# Patient Record
Sex: FEMALE | Race: WHITE | NOT HISPANIC OR LATINO | URBAN - METROPOLITAN AREA
[De-identification: names, ages, dates, MRNs, and addresses within clinical notes are randomized per-mention and may not be internally consistent; named-entity substitution may affect disease eponyms.]

---
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
## 2025-01-02 ENCOUNTER — APPOINTMENT (OUTPATIENT)
Dept: RADIOLOGY | Facility: MEDICAL CENTER | Age: 20
End: 2025-01-02
Attending: EMERGENCY MEDICINE
Payer: MEDICAID

## 2025-01-02 ENCOUNTER — HOSPITAL ENCOUNTER (EMERGENCY)
Facility: MEDICAL CENTER | Age: 20
End: 2025-01-02
Attending: EMERGENCY MEDICINE
Payer: MEDICAID

## 2025-01-02 VITALS
BODY MASS INDEX: 42.94 KG/M2 | OXYGEN SATURATION: 97 % | DIASTOLIC BLOOD PRESSURE: 74 MMHG | HEIGHT: 69 IN | TEMPERATURE: 96.5 F | SYSTOLIC BLOOD PRESSURE: 114 MMHG | HEART RATE: 74 BPM | WEIGHT: 289.9 LBS | RESPIRATION RATE: 16 BRPM

## 2025-01-02 DIAGNOSIS — R42 DIZZY SPELLS: ICD-10-CM

## 2025-01-02 LAB
FLUAV RNA SPEC QL NAA+PROBE: NEGATIVE
FLUBV RNA SPEC QL NAA+PROBE: NEGATIVE
RSV RNA SPEC QL NAA+PROBE: NEGATIVE
SARS-COV-2 RNA RESP QL NAA+PROBE: NOTDETECTED
SPECIMEN SOURCE: NORMAL

## 2025-01-02 PROCEDURE — 99283 EMERGENCY DEPT VISIT LOW MDM: CPT | Mod: 25

## 2025-01-02 PROCEDURE — 70450 CT HEAD/BRAIN W/O DYE: CPT

## 2025-01-02 PROCEDURE — 0241U HCHG SARS-COV-2 COVID-19 NFCT DS RESP RNA 4 TRGT MIC: CPT

## 2025-01-02 RX ORDER — ASPIRIN 81 MG
6 TABLET, DELAYED RELEASE (ENTERIC COATED) ORAL 2 TIMES DAILY
Qty: 4 ML | Refills: 0 | Status: SHIPPED | OUTPATIENT
Start: 2025-01-02 | End: 2025-01-06

## 2025-01-02 RX ORDER — MECLIZINE HYDROCHLORIDE 25 MG/1
25 TABLET ORAL 3 TIMES DAILY PRN
Qty: 15 TABLET | Refills: 0 | Status: SHIPPED | OUTPATIENT
Start: 2025-01-02

## 2025-01-02 RX ORDER — ONDANSETRON 4 MG/1
4 TABLET, ORALLY DISINTEGRATING ORAL EVERY 8 HOURS PRN
Qty: 10 TABLET | Refills: 0 | Status: SHIPPED | OUTPATIENT
Start: 2025-01-02

## 2025-01-02 ASSESSMENT — FIBROSIS 4 INDEX: FIB4 SCORE: 0.28

## 2025-01-03 NOTE — ED PROVIDER NOTES
"ED Provider Note  CHIEF COMPLAINT  Chief Complaint   Patient presents with    Flu Like Symptoms     Light headedness, night sweats, and feeling fatigue.        JACQUE Jacobo (Spencer) is a 19 y.o. transgender female to male who presents for evaluation of continued lightheadedness, dizziness, and off-balance sensation.  He notes it is not necessarily positional but sometimes he feels like he is leaning when he is not.  He is not specific about direction but also notes that the lightheadedness that he has had since the 25th of 26 December, has been persistent.  He is also having trouble sleeping at night and waking without causes.  He notes no ear pain or loss of hearing, no recent fevers or chills.  He has had night sweats and has a feeling of generalized malaise and fatigue.  He has been evaluated at urgent care, and most recently in the emergency department here three ago.  Laboratory evaluation and CT imaging of the chest did not demonstrate any significant pathology requiring pacific treatment or inpatient hospitalization.  Patient is concerned that the lightheadedness persists and wonders if he should get \"an MRI of my brain.\"  EXTERNAL RECORDS REVIEWED  Reviewed ED visit 3 days ago.  ROS  Constitutional: No fevers or chills.  Night sweats, fatigue, malaise, lightheadedness  Skin: No rashes  HEENT: No sore throat, or runny nose.  No loss of hearing or ear pain.  No headache  Neck: No neck pain  Chest: No pain or rashes  Pulm: No shortness of breath, cough, wheezing, stridor, or pain with inspiration/expiration  Gastrointestinal: No nausea, vomiting, diarrhea, constipation, bloating, melena, hematochezia or abdominal pain.  Genitourinary: No dysuria or hematuria  Musculoskeletal: No pain, swelling, or focal weakness  Neurologic: No sensory or focal motor changes to extremities. No confusion or disorientation.  Heme: No bleeding or bruising problems.   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    SOCIAL HISTORY  Social History     Tobacco Use    Smoking status: Never    Smokeless tobacco: Never   Vaping Use    Vaping status: Never Used   Substance and Sexual Activity    Alcohol use: Never    Drug use: Never    Sexual activity: Not on file       SURGICAL HISTORY  patient denies any surgical history    CURRENT MEDICATIONS  Home Medications       Reviewed by Aron Lowery R.N. (Registered Nurse) on 01/02/25 at 1651  Med List Status: Not Addressed     Medication Last Dose Status   cetirizine (ZYRTEC) 10 MG TABS  Active   norethindrone (AYGESTIN) 5 MG tablet  Active   Pediatric Multiple Vit-C-FA (CHILDRENS MULTIVITAMIN) Chew Tab  Active   predniSONE (DELTASONE) 20 MG Tab  Active   SYRINGE/NEEDLE, DISP, 1 ML (BD LUER-JOSE MANUEL SYRINGE) 25G X 5/8\" 1 ML Misc  Active   ULTICARE TUBERCULIN SAFETY SYR 25G X 5/8\" 1 ML Misc  Active   vitamin D (CHOLECALCIFEROL) 1000 Unit (25 mcg) Tab  Active                  Audit from Redirected Encounters    **Home medications have not yet been reviewed for this encounter**          ALLERGIES  Allergies   Allergen Reactions    Seasonal Runny Nose     Runny Nose       PHYSICAL EXAM  VITAL SIGNS: /74   Pulse 74   Temp 35.8 °C (96.5 °F) (Temporal)   Resp 18   Ht 1.753 m (5' 9\")   Wt (!) 132 kg (289 lb 14.5 oz)   SpO2 97%   BMI 42.81 kg/m²    Gen: Alert in no apparent distress.  HEENT: No signs of trauma, Bilateral external ears normal, Nose normal. Conjunctiva normal, Non-icteric.  No significant erythema or lesions to the external canal on the right, TM partially occluded by cerumen however visible portions were nonerythemic and " "there did not appear to be any bulging or retraction.  Left TM occluded by cerumen.  No external canal lesions.  Cardiovascular: Regular rate and rhythm, no murmurs.  Capillary refill less than 3 seconds to all extremities, 2+ distal pulses.  Thorax & Lungs: Normal breath sounds, No respiratory distress, No wheezing bilateral chest rise  Abdomen: No distention  Skin: Warm, Dry, No erythema, No rash noted to exposed areas.   Extremities: Intact distal pulses, No edema  Neurologic: Alert , no facial droop, grossly normal coordination and strength  Psychiatric: Affect pleasant    INITIAL IMPRESSION  Patient arrives for similar symptoms that brought him in a few days ago and he is now concerned that this may be an issue \"in my head.\"  After discussion with the patient, I feel it is reasonable to perform a screening CT of the head without contrast to rule out a space-occupying lesion, however I let him know that I felt this was very unlikely to demonstrate any acute or significant pathology requiring intervention, based on the lack of any unilateral or localizing neurologic signs or symptoms.  He has no headache or neck stiffness and has not had any recent trauma.  Patient wished to proceed with CT imaging, and we will result the viral swab as well.  Unlikely that he will benefit from serum evaluation or advanced imaging at this point.    ED observation? No    LABS  Results for orders placed or performed during the hospital encounter of 01/02/25   CoV-2, Flu A/B, And RSV by PCR ([facility])    Collection Time: 01/02/25  5:04 PM    Specimen: Respirate   Result Value Ref Range    Influenza virus A RNA Negative Negative    Influenza virus B, PCR Negative Negative    RSV, PCR Negative Negative    SARS-CoV-2 by PCR NotDetected     SARS-CoV-2 Source Nasal Swab        RADIOLOGY  CT-HEAD W/O   Final Result      Negative noncontrast CT scan of the head.                   ASSESSMENT, COURSE AND PLAN  Care Narrative:   7:06 " PM  Patient calm, conversant, and states understanding of the findings.  There is no evidence for space-occupying lesion or any other emergent issue.  His viral swab is negative.  He is wondering if removing the earwax from his ears may help.  While this is possible, but is not necessarily emergent and I feel giving him Debrox drops for home is reasonable.  Regarding symptom treatment, we discussed options and I feel prescribing both Zofran for nausea, and meclizine for his vertiginous feelings is very reasonable.  I do not feel further labs or advanced imaging will benefit him beyond what is already been done.  He states understanding of this and is comfortable plan for follow-up.  He will likely need to contact his primary care physician for referral to an ENT if symptoms persist, but states clear understanding return instructions        I have discussed management of the patient with the following physicians and CONNOR's:      Escalation of care considered, and ultimately not performed:Laboratory analysis    Barriers to care at this time, including but not limited to: None.     Decision tools and Rx drugs considered including, but not limited to : Meclizine, Zofran, Debrox    Discussion of management with other QHP or appropriate source(s): None    The patient will not drink alcohol nor drive with prescribed medications. The patient will return for worsening symptoms and is stable at the time of discharge. The patient verbalizes understanding and will comply.    FINAL IMPRESSION  1. Dizzy spells        Electronically signed by: Galen Crane M.D., 1/2/2025 5:25 PM

## 2025-01-03 NOTE — ED TRIAGE NOTES
"Patient presents to the ER with the following complaints:    Chief Complaint   Patient presents with    Flu Like Symptoms     Light headedness, night sweats, and feeling fatigue.      BP (!) 147/85   Pulse 100   Temp 35.8 °C (96.5 °F) (Temporal)   Resp 18   Ht 1.753 m (5' 9\")   Wt (!) 132 kg (289 lb 14.5 oz)   SpO2 96%   BMI 42.81 kg/m²       "

## 2025-01-03 NOTE — ED NOTES
Dc instructions and medications discussed with patient at bedside. All questions answered at this time. VSS. Pt to lobby without incident. Friend to drive patient home.

## 2025-01-04 ENCOUNTER — HOSPITAL ENCOUNTER (EMERGENCY)
Facility: MEDICAL CENTER | Age: 20
End: 2025-01-05
Attending: EMERGENCY MEDICINE
Payer: MEDICAID

## 2025-01-04 DIAGNOSIS — R42 VERTIGO: ICD-10-CM

## 2025-01-04 DIAGNOSIS — R07.9 ACUTE CHEST PAIN: ICD-10-CM

## 2025-01-04 DIAGNOSIS — R42 DIZZINESS: ICD-10-CM

## 2025-01-04 LAB — EKG IMPRESSION: NORMAL

## 2025-01-04 PROCEDURE — 700102 HCHG RX REV CODE 250 W/ 637 OVERRIDE(OP): Performed by: EMERGENCY MEDICINE

## 2025-01-04 PROCEDURE — A9270 NON-COVERED ITEM OR SERVICE: HCPCS | Performed by: EMERGENCY MEDICINE

## 2025-01-04 PROCEDURE — 99284 EMERGENCY DEPT VISIT MOD MDM: CPT

## 2025-01-04 PROCEDURE — 93005 ELECTROCARDIOGRAM TRACING: CPT | Mod: TC | Performed by: EMERGENCY MEDICINE

## 2025-01-04 PROCEDURE — 36415 COLL VENOUS BLD VENIPUNCTURE: CPT

## 2025-01-04 RX ORDER — SODIUM CHLORIDE 9 MG/ML
1000 INJECTION, SOLUTION INTRAVENOUS ONCE
Status: DISCONTINUED | OUTPATIENT
Start: 2025-01-04 | End: 2025-01-05 | Stop reason: HOSPADM

## 2025-01-04 RX ORDER — MECLIZINE HYDROCHLORIDE 25 MG/1
25 TABLET ORAL ONCE
Status: COMPLETED | OUTPATIENT
Start: 2025-01-04 | End: 2025-01-04

## 2025-01-04 RX ADMIN — MECLIZINE HYDROCHLORIDE 25 MG: 25 TABLET ORAL at 23:43

## 2025-01-04 ASSESSMENT — FIBROSIS 4 INDEX: FIB4 SCORE: 0.28

## 2025-01-05 VITALS
DIASTOLIC BLOOD PRESSURE: 80 MMHG | TEMPERATURE: 97.3 F | HEART RATE: 89 BPM | SYSTOLIC BLOOD PRESSURE: 118 MMHG | RESPIRATION RATE: 20 BRPM | BODY MASS INDEX: 42.87 KG/M2 | HEIGHT: 69 IN | OXYGEN SATURATION: 95 % | WEIGHT: 289.46 LBS

## 2025-01-05 LAB
ALBUMIN SERPL BCP-MCNC: 4.2 G/DL (ref 3.2–4.9)
ALBUMIN/GLOB SERPL: 1.2 G/DL
ALP SERPL-CCNC: 121 U/L (ref 30–99)
ALT SERPL-CCNC: 26 U/L (ref 2–50)
ANION GAP SERPL CALC-SCNC: 15 MMOL/L (ref 7–16)
AST SERPL-CCNC: 24 U/L (ref 12–45)
BASOPHILS # BLD AUTO: 0.3 % (ref 0–1.8)
BASOPHILS # BLD: 0.05 K/UL (ref 0–0.12)
BILIRUB SERPL-MCNC: 0.2 MG/DL (ref 0.1–1.5)
BUN SERPL-MCNC: 7 MG/DL (ref 8–22)
CALCIUM ALBUM COR SERPL-MCNC: 9.1 MG/DL (ref 8.5–10.5)
CALCIUM SERPL-MCNC: 9.3 MG/DL (ref 8.4–10.2)
CHLORIDE SERPL-SCNC: 103 MMOL/L (ref 96–112)
CO2 SERPL-SCNC: 20 MMOL/L (ref 20–33)
CREAT SERPL-MCNC: 0.75 MG/DL (ref 0.5–1.4)
EOSINOPHIL # BLD AUTO: 0.46 K/UL (ref 0–0.51)
EOSINOPHIL NFR BLD: 3.2 % (ref 0–6.9)
ERYTHROCYTE [DISTWIDTH] IN BLOOD BY AUTOMATED COUNT: 38.6 FL (ref 35.9–50)
GFR SERPLBLD CREATININE-BSD FMLA CKD-EPI: 117 ML/MIN/1.73 M 2
GLOBULIN SER CALC-MCNC: 3.6 G/DL (ref 1.9–3.5)
GLUCOSE SERPL-MCNC: 82 MG/DL (ref 65–99)
HCG SERPL QL: NEGATIVE
HCT VFR BLD AUTO: 42.9 % (ref 37–47)
HGB BLD-MCNC: 14.3 G/DL (ref 12–16)
IMM GRANULOCYTES # BLD AUTO: 0.06 K/UL (ref 0–0.11)
IMM GRANULOCYTES NFR BLD AUTO: 0.4 % (ref 0–0.9)
LYMPHOCYTES # BLD AUTO: 3.56 K/UL (ref 1–4.8)
LYMPHOCYTES NFR BLD: 24.7 % (ref 22–41)
MAGNESIUM SERPL-MCNC: 2.1 MG/DL (ref 1.5–2.5)
MCH RBC QN AUTO: 26.2 PG (ref 27–33)
MCHC RBC AUTO-ENTMCNC: 33.3 G/DL (ref 32.2–35.5)
MCV RBC AUTO: 78.6 FL (ref 81.4–97.8)
MONOCYTES # BLD AUTO: 0.96 K/UL (ref 0–0.85)
MONOCYTES NFR BLD AUTO: 6.7 % (ref 0–13.4)
NEUTROPHILS # BLD AUTO: 9.3 K/UL (ref 1.82–7.42)
NEUTROPHILS NFR BLD: 64.7 % (ref 44–72)
NRBC # BLD AUTO: 0 K/UL
NRBC BLD-RTO: 0 /100 WBC (ref 0–0.2)
PLATELET # BLD AUTO: 225 K/UL (ref 164–446)
PMV BLD AUTO: 11.4 FL (ref 9–12.9)
POTASSIUM SERPL-SCNC: 3.7 MMOL/L (ref 3.6–5.5)
PROT SERPL-MCNC: 7.8 G/DL (ref 6–8.2)
RBC # BLD AUTO: 5.46 M/UL (ref 4.2–5.4)
SODIUM SERPL-SCNC: 138 MMOL/L (ref 135–145)
TROPONIN T SERPL-MCNC: <6 NG/L (ref 6–19)
TSH SERPL DL<=0.005 MIU/L-ACNC: 7.41 UIU/ML (ref 0.38–5.33)
WBC # BLD AUTO: 14.4 K/UL (ref 4.8–10.8)

## 2025-01-05 PROCEDURE — 80053 COMPREHEN METABOLIC PANEL: CPT

## 2025-01-05 PROCEDURE — 36415 COLL VENOUS BLD VENIPUNCTURE: CPT

## 2025-01-05 PROCEDURE — 84703 CHORIONIC GONADOTROPIN ASSAY: CPT

## 2025-01-05 PROCEDURE — 99284 EMERGENCY DEPT VISIT MOD MDM: CPT

## 2025-01-05 PROCEDURE — 84484 ASSAY OF TROPONIN QUANT: CPT

## 2025-01-05 PROCEDURE — 85025 COMPLETE CBC W/AUTO DIFF WBC: CPT

## 2025-01-05 PROCEDURE — 84443 ASSAY THYROID STIM HORMONE: CPT

## 2025-01-05 PROCEDURE — 83735 ASSAY OF MAGNESIUM: CPT

## 2025-01-05 ASSESSMENT — HEART SCORE
AGE: <45
RISK FACTORS: 1-2 RISK FACTORS
HISTORY: SLIGHTLY SUSPICIOUS
ECG: NORMAL
TROPONIN: LESS THAN OR EQUAL TO NORMAL LIMIT
HEART SCORE: 1

## 2025-01-05 NOTE — ED TRIAGE NOTES
"Chief Complaint   Patient presents with    Chest Pain     Intermittently for the past couple of weeks    Dizziness     For the past couple of weeks; sometimes feels like he's going to faint or pass out     BP (!) 145/97   Pulse (!) 118   Temp 36.3 °C (97.3 °F) (Temporal)   Resp 18   Ht 1.753 m (5' 9\")   Wt (!) 131 kg (289 lb 7.4 oz)   SpO2 96%   BMI 42.75 kg/m²     Patient presents with continued complaints of dizziness and chest pain that came on suddenly this evening while sitting and knitting. Also endorses that he felt a \"wave of confusion and didn't recognize [his] hands.\" States that these episodes started nearly 2 weeks ago and were occurring intermittently. This is the third ER visit for these same symptoms. Patient has a PCP appointment on Monday. Patient currently feels a bit dizzy \"like on a boat\".   "

## 2025-01-05 NOTE — ED NOTES
Patient discharged home in stable condition with friend  AVS provided with recommended follow up and home care instructions and education information  No prescriptions provided at this time  Patient and friend verbalized understanding  Ambulatory at time of discharge

## 2025-01-05 NOTE — ED PROVIDER NOTES
ED Provider Note    CHIEF COMPLAINT  Chief Complaint   Patient presents with    Chest Pain     Intermittently for the past couple of weeks    Dizziness     For the past couple of weeks; sometimes feels like he's going to faint or pass out       EXTERNAL RECORDS REVIEWED  Outpatient Notes reviewed office visit progress note dated December 30, 2024 by JUANJOSE Avilez.  Patient seen for dizziness and referred to emergency department for higher level of care.  and Outpatient labs & studies reviewed unremarkable CBC with normal hemoglobin of 13.2 as well as unremarkable CMP with only mild hyperglycemia 105 dated December 31, 2024.  Normal troponin at the time.  In addition reviewed unremarkable CTA of chest demonstrated no pulmonary embolism from same day    HPI/ROS  LIMITATION TO HISTORY   Select: : None  OUTSIDE HISTORIAN(S):  Significant other at bedside notes patient had strokelike symptoms last year that self resolved, were not evaluated at the time.    Kerri Jacobo is a 19 y.o. person who presents for evaluation of dizziness.  Patient relates at least 3 weeks of lightheaded dizzy sensation and feeling as if they might lose consciousness.  Also describes a vertiginous sensation described as feeling sensation of movement without any movement.  On and off, was feeling fairly well today but notes this evening after doing some knitting had recurrence of symptoms.  Describes both vertiginous and lightheaded, also sensation discomfort to left chest as well.  Notes transient diaphoresis that self resolved.  No focal numbness nor weakness, no loss of conscious nor head strike, no exertional component.    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    SURGICAL HISTORY  patient denies any surgical history    FAMILY HISTORY  Family History   Problem Relation Age of Onset    Arthritis Maternal Grandmother         RA    Diabetes Maternal Grandfather         T2DM    Breast Cancer  "Maternal great-grandmother        SOCIAL HISTORY  Social History     Tobacco Use    Smoking status: Never    Smokeless tobacco: Never   Vaping Use    Vaping status: Never Used   Substance and Sexual Activity    Alcohol use: Never    Drug use: Never    Sexual activity: Not on file       CURRENT MEDICATIONS  Home Medications       Reviewed by Mita Gallo R.N. (Registered Nurse) on 01/04/25 at 2240  Med List Status: Not Addressed     Medication Last Dose Status   carbamide peroxide (DEBROX) 6.5 % Solution  Active   cetirizine (ZYRTEC) 10 MG TABS  Active   meclizine (ANTIVERT) 25 MG Tab  Active   norethindrone (AYGESTIN) 5 MG tablet  Active   ondansetron (ZOFRAN ODT) 4 MG TABLET DISPERSIBLE  Active   Pediatric Multiple Vit-C-FA (CHILDRENS MULTIVITAMIN) Chew Tab  Active   predniSONE (DELTASONE) 20 MG Tab  Active   SYRINGE/NEEDLE, DISP, 1 ML (BD LUER-JOSE MANUEL SYRINGE) 25G X 5/8\" 1 ML Misc  Active   ULTICARE TUBERCULIN SAFETY SYR 25G X 5/8\" 1 ML Misc  Active   vitamin D (CHOLECALCIFEROL) 1000 Unit (25 mcg) Tab  Active                    ALLERGIES  Allergies   Allergen Reactions    Seasonal Runny Nose     Runny Nose       PHYSICAL EXAM  VITAL SIGNS: /80   Pulse 89   Temp 36.3 °C (97.3 °F) (Temporal)   Resp 20   Ht 1.753 m (5' 9\")   Wt (!) 131 kg (289 lb 7.4 oz)   SpO2 95%   BMI 42.75 kg/m²    .General: Alert, no acute distress  Skin: Warm, dry, normal for ethnicity  Head: Normocephalic, atraumatic  Neck: Trachea midline, no tenderness  Eye: PERRL, normal conjunctiva, extraocular movements intact with nystagmus with rapid component to the left.  Tympanic membranes pearly gray and unremarkable bilaterally.  ENMT: Oral mucosa dry, no pharyngeal erythema or exudate  Cardiovascular: regular rate and rhythm, No murmur, Normal peripheral perfusion  Respiratory: Lungs CTA, respirations are non-labored, breath sounds are equal  Gastrointestinal: Soft, nontender, non distended  Neurological: Alert and oriented to person, " place, time, and situation.  Cranial nerves II through XII grossly intact, no upper nor lower extremity pronator drift.  Upper and lower extremity strength and sensation 5 x 5 and symmetric bilaterally  Lymphatics: No lymphadenopathy  Psychiatric: Cooperative, appropriate mood & affect     EKG/LABS  Results for orders placed or performed during the hospital encounter of 25   EKG    Collection Time: 25 11:03 PM   Result Value Ref Range    Report       [facility] Emergency Dept.    Test Date:  2025  Pt Name:    ALYCIA MILLER               Department: [facility]  MRN:        [id number]                      Room:  Gender:     Female                       Technician: [id number]  :        2005                   Requested By:CUAUHTEMOC KAUR  Order #:    [id number]                    Reading MD: CUAUHTEMOC KAUR MD    Measurements  Intervals                                Axis  Rate:       105                          P:          59  AR:         140                          QRS:        85  QRSD:       87                           T:          27  QT:         331  QTc:        438    Interpretive Statements  Sinus tachycardia  Borderline Q waves in inferior leads  Baseline wander in lead(s) II,aVR,aVF  Compared to ECG 2024 05:43:43  Sinus rhythm no longer present  Possible ischemia no longer present  Electronically Signed On 2025 23:03:04 PST by CUAUHTEMOC KAUR MD     HCG Qual Serum    Collection Time: 25 12:25 AM   Result Value Ref Range    Beta-Hcg Qualitative Serum Negative Negative   CBC w/ Differential    Collection Time: 25 12:25 AM   Result Value Ref Range    WBC 14.4 (H) 4.8 - 10.8 K/uL    RBC 5.46 (H) 4.20 - 5.40 M/uL    Hemoglobin 14.3 12.0 - 16.0 g/dL    Hematocrit 42.9 37.0 - 47.0 %    MCV 78.6 (L) 81.4 - 97.8 fL    MCH 26.2 (L) 27.0 - 33.0 pg    MCHC 33.3 32.2 - 35.5 g/dL    RDW 38.6 35.9 - 50.0 fL    Platelet Count 225 164 - 446  K/uL    MPV 11.4 9.0 - 12.9 fL    Neutrophils-Polys 64.70 44.00 - 72.00 %    Lymphocytes 24.70 22.00 - 41.00 %    Monocytes 6.70 0.00 - 13.40 %    Eosinophils 3.20 0.00 - 6.90 %    Basophils 0.30 0.00 - 1.80 %    Immature Granulocytes 0.40 0.00 - 0.90 %    Nucleated RBC 0.00 0.00 - 0.20 /100 WBC    Neutrophils (Absolute) 9.30 (H) 1.82 - 7.42 K/uL    Lymphs (Absolute) 3.56 1.00 - 4.80 K/uL    Monos (Absolute) 0.96 (H) 0.00 - 0.85 K/uL    Eos (Absolute) 0.46 0.00 - 0.51 K/uL    Baso (Absolute) 0.05 0.00 - 0.12 K/uL    Immature Granulocytes (abs) 0.06 0.00 - 0.11 K/uL    NRBC (Absolute) 0.00 K/uL   Complete Metabolic Panel (CMP)    Collection Time: 01/05/25 12:25 AM   Result Value Ref Range    Sodium 138 135 - 145 mmol/L    Potassium 3.7 3.6 - 5.5 mmol/L    Chloride 103 96 - 112 mmol/L    Co2 20 20 - 33 mmol/L    Anion Gap 15.0 7.0 - 16.0    Glucose 82 65 - 99 mg/dL    Bun 7 (L) 8 - 22 mg/dL    Creatinine 0.75 0.50 - 1.40 mg/dL    Calcium 9.3 8.4 - 10.2 mg/dL    Correct Calcium 9.1 8.5 - 10.5 mg/dL    AST(SGOT) 24 12 - 45 U/L    ALT(SGPT) 26 2 - 50 U/L    Alkaline Phosphatase 121 (H) 30 - 99 U/L    Total Bilirubin 0.2 0.1 - 1.5 mg/dL    Albumin 4.2 3.2 - 4.9 g/dL    Total Protein 7.8 6.0 - 8.2 g/dL    Globulin 3.6 (H) 1.9 - 3.5 g/dL    A-G Ratio 1.2 g/dL   Magnesium    Collection Time: 01/05/25 12:25 AM   Result Value Ref Range    Magnesium 2.1 1.5 - 2.5 mg/dL   Troponin - STAT Once    Collection Time: 01/05/25 12:25 AM   Result Value Ref Range    Troponin T <6 6 - 19 ng/L   TSH (for screening thyroid dysfunction)    Collection Time: 01/05/25 12:25 AM   Result Value Ref Range    TSH 7.410 (H) 0.380 - 5.330 uIU/mL   ESTIMATED GFR    Collection Time: 01/05/25 12:25 AM   Result Value Ref Range    GFR (CKD-EPI) 117 >60 mL/min/1.73 m 2      I have independently interpreted this EKG        COURSE & MEDICAL DECISION MAKING    ASSESSMENT, COURSE AND PLAN  Care Narrative: This patient is a very pleasant 19-year-old who presents  for evaluation of multiple concerns including persistent dizziness with both a near syncopal and vertiginous component, discomfort of chest as well.  Nonfocal neurologic examination with no deficits, EKG and troponin are also reassuring and nonischemic.  Otherwise nonactionable laboratory analyses other than hemoconcentration consistent with mild dehydration and leukocytosis without left shift or bandemia most likely demargination/hemoconcentration.  No indication of such for inpatient management nor emergent surgical intervention, unclear etiology of symptoms, will trial Antivert for the vertiginous sensation.  Patient is amenable to follow-up with established primary care and does have an appointment soon.    CHEST PAIN:   HEART Score for Major Cardiac Events  HEART Score     History: Slightly suspicious  ECG: Normal  Age: <45  Risk Factors: 1-2 risk factors  Troponin: Less than or equal to normal limit    Heart Score: 1    Total Score   0-3 Points = Low Score, risk of MACE 0.9-1.7%.  4-6 Points = Moderate Score, risk of MACE 12-16.6%  7-10 Points = High Score, risk of MACE 50-65%    ED OBS: Yes; I am placing the patient in to an observation status due to a diagnostic uncertainty as well as therapeutic intensity. Patient placed in observation status at 11:22 PM, 1/4/2025.     Observation plan is as follows: Patient medicated with Antivert 25 mg p.o., normal saline 1 L.  Metabolic workup as well as troponin and TSH with EKG and cardiac monitoring will be obtained.  Differential diagnosis includes but is not restricted to peripheral vertigo, dehydration, electrolyte derangement, orthostatic near syncope    0130: Labs reassuring, heart rate improved without IV fluids    0204: Unfortunately unable to obtain peripheral IV access.  Reassuringly labs are nonactionable with no evidence of acute kidney injury nor marked electrolyte derangement.  As such we will hold IV fluids at this time.  Patient updated with reassuring  "studies, no evidence of ACS, no hypotension, no sepsis, no significant electrolyte derangement, no anemia, no thrombocytopenia.  No indication for inpatient management, patient minimal to follow-up in outpatient setting.    Upon Reevaluation, the patient's condition has: Improved; and will be discharged.    Patient discharged from ED Observation status at 0220 (Time) 1/5/24 (Date).       Patient Vitals for the past 24 hrs:   BP Temp Temp src Pulse Resp SpO2 Height Weight   01/05/25 0210 -- -- -- 89 20 95 % -- --   01/05/25 0204 118/80 -- -- -- -- -- -- --   01/05/25 0202 -- -- -- 88 (!) 21 97 % -- --   01/05/25 0134 120/78 -- -- -- -- -- -- --   01/05/25 0130 -- -- -- 81 18 97 % -- --   01/05/25 0113 -- -- -- 72 16 97 % -- --   01/05/25 0104 130/77 -- -- -- -- -- -- --   01/05/25 0102 -- -- -- 76 20 99 % -- --   01/05/25 0021 -- -- -- 89 (!) 24 98 % -- --   01/05/25 0018 131/80 -- -- -- -- -- -- --   01/04/25 2235 (!) 145/97 36.3 °C (97.3 °F) Temporal (!) 118 18 96 % 1.753 m (5' 9\") (!) 131 kg (289 lb 7.4 oz)        ADDITIONAL PROBLEMS MANAGED  Chest discomfort without evidence of ACS, elevated blood pressure readings noted without evidence of hypertensive emergency    DISPOSITION AND DISCUSSIONS  I have discussed management of the patient with the following physicians and CONNOR's:  NA    Discussion of management with other [facility] or appropriate source(s): None     Escalation of care considered, and ultimately not performed:acute inpatient care management, however at this time, the patient is most appropriate for outpatient management    Barriers to care at this time, including but not limited to:  NA .     Decision tools and prescription drugs considered including, but not limited to:  NIH stroke scale 0. Heart score 1, low risk stratification .    The patient will return for new or worsening symptoms and is stable at the time of discharge.    Patient has had high blood pressure while in the emergency department, felt " likely secondary to medical condition. Counseled patient to monitor blood pressure at home and follow up with primary care physician.      DISPOSITION:  Patient will be discharged home in stable condition.    FOLLOW UP:  Paulette Lizama M.D.  [address] [place] [address]   [address] [id number] [phone]    Schedule an appointment as soon as possible for a visit         OUTPATIENT MEDICATIONS:  Discharge Medication List as of 1/5/2025  2:12 AM             FINAL DIAGNOSIS  1. Acute chest pain    2. Dizziness    3. Vertigo         Electronically signed by: Susan Barker M.D., 1/4/2025 11:02 PM

## 2025-01-07 ENCOUNTER — TELEPHONE (OUTPATIENT)
Dept: HEALTH INFORMATION MANAGEMENT | Facility: OTHER | Age: 20
End: 2025-01-07
Payer: MEDICAID

## 2025-01-07 DIAGNOSIS — F64.2 GENDER DYSPHORIA IN PEDIATRIC PATIENT: ICD-10-CM

## 2025-01-07 DIAGNOSIS — Z71.87 COUNSELING FOR TRANSITION FROM PEDIATRIC TO ADULT MODEL OF CARE: ICD-10-CM

## 2025-01-07 DIAGNOSIS — R07.9 CHEST PAIN, UNSPECIFIED TYPE: ICD-10-CM

## 2025-01-12 ENCOUNTER — HOSPITAL ENCOUNTER (EMERGENCY)
Facility: MEDICAL CENTER | Age: 20
End: 2025-01-13
Attending: STUDENT IN AN ORGANIZED HEALTH CARE EDUCATION/TRAINING PROGRAM
Payer: MEDICAID

## 2025-01-12 DIAGNOSIS — R42 LIGHTHEADEDNESS: ICD-10-CM

## 2025-01-12 PROCEDURE — 93005 ELECTROCARDIOGRAM TRACING: CPT | Mod: TC | Performed by: STUDENT IN AN ORGANIZED HEALTH CARE EDUCATION/TRAINING PROGRAM

## 2025-01-12 PROCEDURE — 93005 ELECTROCARDIOGRAM TRACING: CPT | Mod: TC

## 2025-01-12 PROCEDURE — 99284 EMERGENCY DEPT VISIT MOD MDM: CPT

## 2025-01-12 ASSESSMENT — FIBROSIS 4 INDEX: FIB4 SCORE: 0.4

## 2025-01-13 VITALS
OXYGEN SATURATION: 96 % | SYSTOLIC BLOOD PRESSURE: 131 MMHG | HEART RATE: 84 BPM | DIASTOLIC BLOOD PRESSURE: 79 MMHG | BODY MASS INDEX: 42.78 KG/M2 | WEIGHT: 288.8 LBS | RESPIRATION RATE: 19 BRPM | HEIGHT: 69 IN

## 2025-01-13 LAB — EKG IMPRESSION: NORMAL

## 2025-01-13 PROCEDURE — 99284 EMERGENCY DEPT VISIT MOD MDM: CPT

## 2025-01-13 NOTE — ED TRIAGE NOTES
"Chief Complaint   Patient presents with    Chest Pain    Dizziness     Pt has been having chest pain and dzziness on and off for about a month now. This episode started about 1 hour ago. Pt did fallow up with PCP about this and they thought she had a UTI and started him on antibiotics.      Ht 1.753 m (5' 9\")   Wt (!) 131 kg (288 lb 12.8 oz)   BMI 42.65 kg/m²       "

## 2025-01-13 NOTE — DISCHARGE INSTRUCTIONS
You were seen in the emergency department for lightheadedness and chest pain.  Your EKG was reassuring.  Your vital signs were reassuring.  We discussed obtaining labs and watching on the cardiac monitor however you would like to be discharged and follow-up with your primary care physician.  If you have worsening symptoms come back to the emergency department for reevaluation.

## 2025-01-13 NOTE — ED PROVIDER NOTES
ED Provider Note    CHIEF COMPLAINT  Chief Complaint   Patient presents with    Chest Pain    Dizziness     Pt has been having chest pain and dzziness on and off for about a month now. This episode started about 1 hour ago. Pt did fallow up with PCP about this and they thought she had a UTI and started him on antibiotics.        EXTERNAL RECORDS REVIEWED  Reviewed 3 internal emergency department notes presenting for similar symptoms over the last 2 weeks, underwent labs, EKG, CT scan of the head, CT pulmonary angiogram during these workups, workups were ultimately unremarkable.  Of note hCG was negative on 1/5/2025.    Reviewed external outpatient PCP note 1/7/2025 evaluated for dizziness with baseline labs, EKG, urinalysis,, was referred to cardiology.  Urinalysis was treated with Macrobid, unfortunately I am unable to see the urine culture result    HPI/ROS  LIMITATION TO HISTORY   Select: : None  OUTSIDE HISTORIAN(S):  Partner at bedside providing clinically relevant collateral history    Nico Jacobo is a 19 y.o. transgender male presenting to the emergency department for evaluation of lightheadedness and chest discomfort.  Patient says that she has had ongoing symptoms for the last several weeks, has been in the emergency department multiple times for similar symptoms.  Today, he was feeling lightheaded while watching a movie, felt like he was about to pass out so he decided to come back to the emergency department for evaluation.    Says that he has had intermittent jittery feeling.  Feels occasional cold sweats and chills but no fevers.  No numbness weakness tingling in the arms of the legs.  No headache.  Describes some pressure in the left side of the chest but seems to move around the chest, no radicular symptoms to the neck, shoulder, jaw.    Patient says that he has passed out 3 times in his life, all associated with seeing his own blood.  Denies any history of exertional syncope.    Patient says that  "he was seen by his PCP several days ago and was diagnosed with a urinary tract infection, was told that this might be contributing to symptoms but has been compliant with antibiotics and lightheadedness continues.    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    SURGICAL HISTORY  patient denies any surgical history    FAMILY HISTORY  Family History   Problem Relation Age of Onset    Arthritis Maternal Grandmother         RA    Diabetes Maternal Grandfather         T2DM    Breast Cancer Maternal great-grandmother        SOCIAL HISTORY  Social History     Tobacco Use    Smoking status: Never    Smokeless tobacco: Never   Vaping Use    Vaping status: Never Used   Substance and Sexual Activity    Alcohol use: Never    Drug use: Never    Sexual activity: Not on file       CURRENT MEDICATIONS  Home Medications       Reviewed by Hema Hopper R.N. (Registered Nurse) on 01/12/25 at 2331  Med List Status: Partial     Medication Last Dose Status   cetirizine (ZYRTEC) 10 MG TABS  Active   meclizine (ANTIVERT) 25 MG Tab  Active   norethindrone (AYGESTIN) 5 MG tablet  Active   ondansetron (ZOFRAN ODT) 4 MG TABLET DISPERSIBLE  Active   Pediatric Multiple Vit-C-FA (CHILDRENS MULTIVITAMIN) Chew Tab  Active   SYRINGE/NEEDLE, DISP, 1 ML (BD LUER-JOSE MANUEL SYRINGE) 25G X 5/8\" 1 ML Misc  Active   ULTICARE TUBERCULIN SAFETY SYR 25G X 5/8\" 1 ML Misc  Active   vitamin D (CHOLECALCIFEROL) 1000 Unit (25 mcg) Tab  Active                    ALLERGIES  Allergies   Allergen Reactions    Seasonal Runny Nose     Runny Nose       PHYSICAL EXAM  VITAL SIGNS: /79   Pulse 84   Resp 19   Ht 1.753 m (5' 9\")   Wt (!) 131 kg (288 lb 12.8 oz)   SpO2 96%   BMI 42.65 kg/m²    General: Well- appearing , non-toxic, no acute distress, obese body habitus  Neuro:   - Alert and oriented  - CN 2-12 grossly intact  - Upper " extremities         - Normal strength         - Sensation intact to light touch  - Lower extremities         - Normal strength         - Sensation intact to light touch  - No pronator drift  - Romberg negative  - Normal finger to nose  - No truncal ataxia  HEENT:   - Head: Normocephalic, atraumatic  - Eyes: PERRL  - Ears/Nose: normal external nose and ears  - Mouth: moist mucosal membranes  Resp: clear to auscultation, no increased work of breathing  CV: Regular rate and rhythm, no appreciable murmur   Abd: Soft, non-tender, non-distended  Extremities: No peripheral edema  Psych: lucid and conversational         DIAGNOSTIC STUDIES / PROCEDURES    EKG  My independent EKG interpretation:  Results for orders placed or performed during the hospital encounter of 25   EKG   Result Value Ref Range    Report       [facility] Emergency Dept.    Test Date:  2025  Pt Name:    ALYCIA MILLER               Department: [facility]  MRN:        [id number]                      [facility]   Gender:     Female                       Technician: [id number]  :        2005                   Requested By:ER TRIAGE PROTOCOL  Order #:    [id number]                    Reading MD: Jose Martin Lugo    Measurements  Intervals                                Axis  Rate:       87                           P:          44  SD:         141                          QRS:        65  QRSD:       94                           T:          38  QT:         390  QTc:        470    Interpretive Statements  Sinus rhythm  normal intervals, normal axis, no evidence of acute ST or T changes.  Electronically Signed On 2025 01:08:38 PST by Jose Martin Lugo           LABS  Results for orders placed or performed during the hospital encounter of 25   EKG    Collection Time: 25  1:08 AM   Result Value Ref Range    Report       [facility] Emergency Dept.    Test Date:  2025  Pt Name:     ALYCIA MILLER               Department[facility]  MRN:        [id number]                      [facility]  Gender:     Female                       Technician: [id number]  :        2005                   Requested By:ER TRIAGE PROTOCOL  Order #:    [id number]                    Reading MD: Jose Martin Lugo    Measurements  Intervals                                Axis  Rate:       87                           P:          44  NH:         141                          QRS:        65  QRSD:       94                           T:          38  QT:         390  QTc:        470    Interpretive Statements  Sinus rhythm  normal intervals, normal axis, no evidence of acute ST or T changes.  Electronically Signed On 2025 01:08:38 PST by Jose Martin Lugo         RADIOLOGY  I have independently interpreted the diagnostic imaging associated with this visit and am waiting the final reading from the radiologist.   My preliminary interpretation is as follows:   -   Radiologist interpretation:   No orders to display           MEDICAL DECISION MAKING      ED COURSE AND PLAN    This is a pleasant 19-year-old transgender male presenting to the emergency department for lightheadedness and chest discomfort.  Patient has been seen 3 times in the emergency department for similar symptoms, was seen by his primary care physician several days ago, diagnosed with urinary tract infection.  Despite antibiotics, patient says that he has had no change in his lightheadedness.    On arrival to the emergency department, vital signs were unremarkable.  EKG reviewed shows no dysrhythmia or ischemic changes.   Neurologic exam is reassuring, no focal deficits to suggest central process, patient is ambulatory without ataxia, low suspicion for cerebellar process.  Recent CT scan of the head was obtained was unremarkable.    I intended to obtain baseline labs, troponin and to observe patient on telemetry here in the emergency department for couple of  hours however he says that labs been drawn multiple times over the last several weeks and have been unremarkable, he was a bit concerned about repeated attempts to draw blood some difficulty getting labs last time he was in the emergency department and he indicated that he wanted to go home.  We discussed that there was some diagnostic ambiguity without gathering further information given's somewhat vague complaints but given reassuring workups over the last several weeks I feel that is reasonable for patient to be discharged with close outpatient follow-up with PCP.  He has been referred by his PCP to cardiology where he might benefit from cardiac event monitor and possibly an echocardiogram.    Unclear specific etiology to ongoing lightheadedness but low suspicion for life-threatening process.  Patient is appropriate for discharge home at this time, strict return precautions discussed.         Procedures:      ----------------------------------------------------------------------------------  DISCUSSIONS    I have discussed management of the patient with the following physicians and CONNOR's:      Discussion of management with other Q or appropriate source(s):     Escalation of care considered, and ultimately not performed: Considered obtaining labs, observing patient on cardiac monitor for several hours    Barriers to care at this time, including but not limited to:     Decision tools and prescription drugs considered including, but not limited to:     FINAL IMPRESSION    1. Lightheadedness        Discharge Medication List as of 1/13/2025 12:19 AM          No follow-up provider specified.      DISPOSITION    Discharge home, Stable        This chart was dictated using an electronic voice recognition software. The chart has been reviewed and edited but there is still possibility for dictation errors due to limitation of software.    Jose Martin Lugo, DO 1/13/2025

## 2025-04-06 ENCOUNTER — APPOINTMENT (OUTPATIENT)
Dept: RADIOLOGY | Facility: MEDICAL CENTER | Age: 20
End: 2025-04-06
Attending: EMERGENCY MEDICINE
Payer: MEDICAID

## 2025-04-06 ENCOUNTER — HOSPITAL ENCOUNTER (EMERGENCY)
Facility: MEDICAL CENTER | Age: 20
End: 2025-04-06
Attending: EMERGENCY MEDICINE
Payer: MEDICAID

## 2025-04-06 VITALS
BODY MASS INDEX: 39.88 KG/M2 | SYSTOLIC BLOOD PRESSURE: 143 MMHG | WEIGHT: 284.83 LBS | TEMPERATURE: 98.6 F | OXYGEN SATURATION: 94 % | DIASTOLIC BLOOD PRESSURE: 75 MMHG | HEIGHT: 71 IN | RESPIRATION RATE: 21 BRPM | HEART RATE: 99 BPM

## 2025-04-06 DIAGNOSIS — R06.02 SHORTNESS OF BREATH: ICD-10-CM

## 2025-04-06 DIAGNOSIS — J45.901 MILD ASTHMA WITH ACUTE EXACERBATION, UNSPECIFIED WHETHER PERSISTENT: ICD-10-CM

## 2025-04-06 DIAGNOSIS — J22 LOWER RESPIRATORY INFECTION: ICD-10-CM

## 2025-04-06 PROCEDURE — 700101 HCHG RX REV CODE 250: Performed by: EMERGENCY MEDICINE

## 2025-04-06 PROCEDURE — 94760 N-INVAS EAR/PLS OXIMETRY 1: CPT

## 2025-04-06 PROCEDURE — 99284 EMERGENCY DEPT VISIT MOD MDM: CPT

## 2025-04-06 PROCEDURE — 71045 X-RAY EXAM CHEST 1 VIEW: CPT

## 2025-04-06 PROCEDURE — 94640 AIRWAY INHALATION TREATMENT: CPT

## 2025-04-06 RX ORDER — ALBUTEROL SULFATE 5 MG/ML
2.5 SOLUTION RESPIRATORY (INHALATION) ONCE
Status: COMPLETED | OUTPATIENT
Start: 2025-04-06 | End: 2025-04-06

## 2025-04-06 RX ADMIN — ALBUTEROL SULFATE 2.5 MG: 2.5 SOLUTION RESPIRATORY (INHALATION) at 05:23

## 2025-04-06 ASSESSMENT — FIBROSIS 4 INDEX: FIB4 SCORE: 0.4

## 2025-04-06 NOTE — ED TRIAGE NOTES
"Chief Complaint   Patient presents with    Shortness of Breath     X1day currently on day 4 of oral antibiotic treatment for strep  Reports using inhaler w/no relief   Speaking full clear sentences   Denies fevers     BP (!) 174/104   Pulse (!) 101   Temp 37 °C (98.6 °F) (Temporal)   Resp 16   Ht 1.798 m (5' 10.8\")   Wt (!) 129 kg (284 lb 13.4 oz)   LMP 02/24/2025 (Approximate)   SpO2 97%   BMI 39.95 kg/m²     "

## 2025-04-06 NOTE — ED PROVIDER NOTES
ED Provider Note    CHIEF COMPLAINT  Chief Complaint   Patient presents with    Shortness of Breath     X1day currently on day 4 of oral antibiotic treatment for strep  Reports using inhaler w/no relief   Speaking full clear sentences   Denies fevers       EXTERNAL RECORDS REVIEWED  Multiple outpatient office visits at [facility] ranging from dizziness, cough, diplopia, acute vaginitis, strep pharyngitis, asthma.    HPI/ROS  LIMITATION TO HISTORY   Select: : None  OUTSIDE HISTORIAN(S):  Patient's significant other    Kerri Jacobo is a 19 y.o. person who presents tonight with a chief complaint of shortness of breath despite using his albuterol inhaler.  He states he has been on a 4-day course of oral antibiotics for strep but does not seem to be getting better and he seems to think that he may have pneumonia.  He has been coughing up phlegm that is very unpleasant.  He denies any fever or chills.  No nausea, vomiting or diarrhea.  He denies any tobacco use.    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    SURGICAL HISTORY  patient denies any surgical history    FAMILY HISTORY  Family History   Problem Relation Age of Onset    Arthritis Maternal Grandmother         RA    Diabetes Maternal Grandfather         T2DM    Breast Cancer Maternal great-grandmother        SOCIAL HISTORY  Social History     Tobacco Use    Smoking status: Never    Smokeless tobacco: Never   Vaping Use    Vaping status: Never Used   Substance and Sexual Activity    Alcohol use: Never    Drug use: Never    Sexual activity: Not on file       CURRENT MEDICATIONS  Home Medications       Reviewed by Annmarie Carvalho R.N. (Registered Nurse) on 04/06/25 at 5381  Med List Status: Not Addressed     Medication Last Dose Status   cetirizine (ZYRTEC) 10 MG TABS  Active   meclizine (ANTIVERT) 25 MG Tab  Active   norethindrone (AYGESTIN) 5 MG tablet  Active   ondansetron (ZOFRAN ODT) 4  "MG TABLET DISPERSIBLE  Active   Pediatric Multiple Vit-C-FA (CHILDRENS MULTIVITAMIN) Chew Tab  Active   SYRINGE/NEEDLE, DISP, 1 ML (BD LUER-JOSE MANUEL SYRINGE) 25G X 5/8\" 1 ML Misc  Active   ULTICARE TUBERCULIN SAFETY SYR 25G X 5/8\" 1 ML Misc  Active   vitamin D (CHOLECALCIFEROL) 1000 Unit (25 mcg) Tab  Active                  Audit from Redirected Encounters    **Home medications have not yet been reviewed for this encounter**         ALLERGIES  Allergies   Allergen Reactions    Atrovent Nasal Spray [Ipratropium] Shortness of Breath     \"Turns red\" per pt    Prednisone      Psychosis     Seasonal Runny Nose     Runny Nose       PHYSICAL EXAM  VITAL SIGNS: BP (!) 143/75   Pulse 99   Temp 37 °C (98.6 °F) (Temporal)   Resp (!) 21   Ht 1.798 m (5' 10.8\")   Wt (!) 129 kg (284 lb 13.4 oz)   LMP 02/24/2025 (Approximate)   SpO2 94%   BMI 39.95 kg/m²    Constitutional: Patient is well developed, well nourished. Non-toxic appearing. No acute distress.  Able to speak full sentences without difficulty.  HENT: Normocephalic, Nose normal with no mucosal edema or drainage. Oropharynx moist without erythema or exudates.  Eyes: PERRL, EOMI, Conjunctiva without erythema or exudates.   Neck: Supple   Cardiovascular: Normal heart rate and Regular rhythm. No murmur  Thorax & Lungs: Clear and equal breath sounds with good excursion.  No acute respiratory distress, clear and equal breath sounds with no rhonchi wheezing or rales.  Abdomen: Bowel sounds normal in all four quadrants. Soft,nontender  Skin: Warm, Dry  Extremities: Peripheral pulses 4/4 No edema, No tenderness  Musculoskeletal: Normal range of motion in all major joints.   Neurologic: Alert & oriented x 3, Normal motor function, Normal sensory function  Psychiatric: Affect normal, Judgment normal, Mood normal.        RADIOLOGY/PROCEDURES   I have independently interpreted the diagnostic imaging associated with this visit and am waiting the final reading from the radiologist. "   My preliminary interpretation is as follows: No acute cardiopulmonary disease.    Radiologist interpretation:  Negative.    COURSE & MEDICAL DECISION MAKING    ASSESSMENT, COURSE AND PLAN  Care Narrative: Patient was given albuterol nebulized treatment since he states that he is allergic to steroids.  Chest x-ray is still currently pending, I did not feel laboratories were indicated at this time as he does not appear to be that ill and the breathing treatment did seem to help.  My plan will be to just change his antibiotics to Augmentin instead of Omnicef as he is not responding well to that.  I will also give him a spacer for his inhaler.  He is to increase fluids, cool-mist humidifier at the bedside, no dairy products, fever control, follow-up with his primary care doctor within the week for recheck and return if any problems or worsening.        DISPOSITION AND DISCUSSIONS  I have discussed management of the patient with the following physicians and CONNOR's: None    Discussion of management with other QHP or appropriate source(s): RT breathing treatment      Escalation of care considered, and ultimately not performed:Laboratory analysis    Barriers to care at this time, including but not limited to: None.     Decision tools and prescription drugs considered including, but not limited to: Antibiotics Augmentin .    FINAL DIAGNOSIS  1. Shortness of breath    2. Mild asthma with acute exacerbation, unspecified whether persistent    3. Lower respiratory infection         Electronically signed by: Sherita Farrell D.O., 4/6/2025 5:59 AM

## 2025-04-27 ENCOUNTER — OFFICE VISIT (OUTPATIENT)
Dept: URGENT CARE | Facility: CLINIC | Age: 20
End: 2025-04-27
Payer: MEDICAID

## 2025-04-27 VITALS
RESPIRATION RATE: 22 BRPM | OXYGEN SATURATION: 98 % | HEIGHT: 69 IN | BODY MASS INDEX: 42.51 KG/M2 | SYSTOLIC BLOOD PRESSURE: 128 MMHG | WEIGHT: 287 LBS | DIASTOLIC BLOOD PRESSURE: 92 MMHG | TEMPERATURE: 97.8 F | HEART RATE: 96 BPM

## 2025-04-27 DIAGNOSIS — R05.1 ACUTE COUGH: ICD-10-CM

## 2025-04-27 DIAGNOSIS — Z87.09 HISTORY OF ASTHMA: ICD-10-CM

## 2025-04-27 DIAGNOSIS — R06.2 WHEEZING: ICD-10-CM

## 2025-04-27 RX ORDER — TESTOSTERONE CYPIONATE 200 MG/ML
INJECTION, SOLUTION INTRAMUSCULAR
COMMUNITY
Start: 2025-02-18

## 2025-04-27 RX ORDER — ALBUTEROL SULFATE 0.83 MG/ML
2.5 SOLUTION RESPIRATORY (INHALATION)
COMMUNITY
Start: 2025-04-13

## 2025-04-27 RX ORDER — AZITHROMYCIN 250 MG/1
TABLET, FILM COATED ORAL
Qty: 6 TABLET | Refills: 0 | Status: SHIPPED | OUTPATIENT
Start: 2025-04-27

## 2025-04-27 RX ORDER — ALBUTEROL SULFATE 90 UG/1
INHALANT RESPIRATORY (INHALATION)
COMMUNITY

## 2025-04-27 ASSESSMENT — FIBROSIS 4 INDEX: FIB4 SCORE: 0.23

## 2025-04-27 NOTE — PROGRESS NOTES
Subjective:     Verbal consent was acquired by the patient to use [employer] ambient listening note generation during this visit     Kerri Jacobo is a 19 y.o. person who presents for Shortness of Breath (Hx of asthma. Has been having to use his rescue inhaler more consistently, has been having wheezing, SOB, lightheadedness, dry cough x1 week. Sx are only relieved for 30 min after taking albuterol inhaler. )         History of Present Illness  The patient presents for evaluation of asthma.    The chief complaint is increased shortness of breath. . A history of asthma is noted, typically requiring the use of a rescue inhaler every 4 days. Recently, shortness of breath has increased, necessitating daily use of the inhaler. Symptoms are most severe in the early morning and late at night, with minimal relief from the inhaler. No fevers or body aches are reported. An upcoming appointment with a pulmonologist is scheduled for 04/27/2025 as the patient is unable to tolerate oral or inhaled steroids due to psychosis.  He did have a nebulizer ordered. The nebulizer has not yet been picked up from [employer]. Albuterol is used as the primary inhaler. Atrovent was tried but caused severe shortness of breath and flushing. Advair was also tried but was not well-tolerated. There is a known allergy to prednisone, and psychosis has been experienced with other steroids. A severe asthma attack occurred in 11/2024, for which prednisone was prescribed, but it exacerbated symptoms.    Pneumonia was diagnosed in 03/2025, confirmed by a chest x-ray, and treated with antibiotics. The condition improved, but bronchitis subsequently developed, requiring emergency room treatment. A brief period of wellness was experienced before current symptoms began. A raw throat from coughing is reported, which is infrequent but severe, with a metallic taste in the mouth when coughing. No ear pain is reported, but persistent vertigo has been present  "since 12/2024. There are no concerns about COVID-19, influenza, or RSV. Pneumonia and a lung infection occurred within the last month. Strep throat was experienced three times.      ROS      Medications:  albuterol Aers  albuterol Nebu  amoxicillin-clavulanate Tabs  BD Luer-Roberto Syringe Misc  cetirizine Tabs  Childrens Multivitamin Chew  meclizine Tabs  norethindrone  ondansetron Tbdp  testosterone cypionate  UltiCare Tuberculin Safety Syr Misc  vitamin D3 Tabs    Allergies:             Atrovent nasal spray [ipratropium], Prednisone, and Seasonal    Past Social Hx:  Kerri Jacobo  reports that he has never smoked. He has never used smokeless tobacco. He reports that he does not drink alcohol and does not use drugs.           Problem list, medications, and allergies reviewed by myself today in Epic.     Objective:     BP (!) 128/92 (BP Location: Left arm, Patient Position: Sitting, BP Cuff Size: Adult)   Pulse 96   Temp 36.6 °C (97.8 °F) (Temporal)   Resp (!) 22   Ht 1.74 m (5' 8.5\") Comment: Pt reported  Wt (!) 130 kg (287 lb)   LMP 02/24/2025 (Approximate)   SpO2 98%   BMI 43.00 kg/m²     Physical Exam  Vitals and nursing note reviewed.   Constitutional:       General: He is not in acute distress.     Appearance: Normal appearance. He is well-developed. He is not ill-appearing, toxic-appearing or diaphoretic.      Comments: This is a nontoxic-appearing child in no apparent distress   HENT:      Head: Normocephalic.      Right Ear: Tympanic membrane normal.      Left Ear: Tympanic membrane normal.      Nose: Congestion and rhinorrhea present.      Mouth/Throat:      Pharynx: Posterior oropharyngeal erythema present.   Eyes:      General:         Right eye: No discharge.         Left eye: No discharge.      Extraocular Movements: Extraocular movements intact.      Conjunctiva/sclera: Conjunctivae normal.      Pupils: Pupils are equal, round, and reactive to light.   Cardiovascular:      Rate and Rhythm: " Normal rate and regular rhythm.      Pulses: Normal pulses.      Heart sounds: Normal heart sounds.   Pulmonary:      Effort: Pulmonary effort is normal. Prolonged expiration present. No tachypnea, accessory muscle usage or respiratory distress.      Breath sounds: Wheezing present. No decreased breath sounds, rhonchi or rales.      Comments: Trace inspiratory wheeze heard to right upper lobe.  No rales, rhonchi.  No respiratory distress.  Musculoskeletal:         General: Normal range of motion.      Cervical back: Normal range of motion and neck supple. No rigidity.   Lymphadenopathy:      Cervical: No cervical adenopathy.   Skin:     General: Skin is warm and dry.   Neurological:      Mental Status: He is alert and oriented to person, place, and time.   Psychiatric:         Behavior: Behavior is cooperative.         Physical Exam  Respiratory: Slight wheeze heard in the right upper lobe, rest of the lungs clear to auscultation.  Cardiovascular: Regular rate and rhythm, no murmurs, rubs, or gallops.          Assessment/Plan:     Diagnosis and Associated Orders:     1. History of asthma  - salmeterol (SEREVENT) 50 MCG/ACT AEROSOL POWDER, BREATH ACTIVATED; Inhale 1 Puff 2 times a day.  Dispense: 60 Each; Refill: 0  - DX-CHEST-2 VIEWS  - azithromycin (ZITHROMAX) 250 MG Tab; Take 2 tablets by mouth on day one. Take one tablet by mouth the remaining days until gone  Dispense: 6 Tablet; Refill: 0    2. Acute cough  - salmeterol (SEREVENT) 50 MCG/ACT AEROSOL POWDER, BREATH ACTIVATED; Inhale 1 Puff 2 times a day.  Dispense: 60 Each; Refill: 0  - DX-CHEST-2 VIEWS  - azithromycin (ZITHROMAX) 250 MG Tab; Take 2 tablets by mouth on day one. Take one tablet by mouth the remaining days until gone  Dispense: 6 Tablet; Refill: 0    3. Wheezing  - salmeterol (SEREVENT) 50 MCG/ACT AEROSOL POWDER, BREATH ACTIVATED; Inhale 1 Puff 2 times a day.  Dispense: 60 Each; Refill: 0  - DX-CHEST-2 VIEWS  - azithromycin (ZITHROMAX) 250 MG Tab;  Take 2 tablets by mouth on day one. Take one tablet by mouth the remaining days until gone  Dispense: 6 Tablet; Refill: 0    Other orders  - testosterone cypionate (DEPO-TESTOSTERONE) 200 MG/ML injection; INJECT 70 MG AS DIRECTED EVERY 7 DAYS.  - albuterol 108 (90 Base) MCG/ACT Aero Soln inhalation aerosol; INHALE 2 PUFFS INTO THE LUNGS EVERY 4 HOURS AS NEEDED FOR UP TO 14 DAYS.  - albuterol (PROVENTIL) 2.5mg/3ml Nebu Soln solution for nebulization; Inhale 2.5 mg.        Medical Decision Making:    [place] 19 y.o. presents to clinic with:    Assessment & Plan  Patient with history of poorly controlled asthma with intolerance to oral or inhaled corticosteroids.  Has albuterol nebulizer without was ordered, has not been picked up.  On today's exam trace wheezing right upper lung, plan he declines albuterol nebulizer treatment here in the clinic.  Vital signs otherwise stable he is afebrile and nonhypoxic.   - Increased shortness of breath and wheezing, requiring daily use of rescue inhaler.  - Oxygen saturation levels are within normal range; right upper lobe wheeze noted on physical exam.  - Discussed the need for better asthma control; reviewed history of adverse reactions to steroids.  - Prescribed salmeterol, 1 puff twice daily; [facility]; ordered chest x-ray for further evaluation; instructed to seek emergency care if symptoms worsen.  May use albuterol as rescue    Recent pneumonia.  - Diagnosed with pneumonia in 03/2025, treated with antibiotics, initial improvement noted.  - Recurrent bronchitis and persistent symptoms reported; no current fever or body aches, does note hemoptysis.  I am going to cover with azithromycin given history of poorly controlled asthma bronchitis and pneumonia  - Ordered chest x-ray to rule out current pneumonia, no x-ray available in clinic today, did give patient alternative sites to have x-ray performed and/or will return tomorrow; advised to  follow up with pulmonology on 04/27/2025 for further management.  Follow-up in ER with progressive worsening.  Reviewed previous ER visit and visit to [facility] urgent care.    I personally reviewed prior external notes and test results pertinent to today's visit. Patient is clinically stable at today's urgent care visit.  Patient appears nontoxic with no acute distress noted. Appropriate for outpatient care at this time.  Return to clinic or go to ED if symptoms worsen or persist.  Red flag symptoms discussed.  Patient/Parent/Guardian voices understanding.   All side effects of medication discussed including allergic response, GI upset, tendon injury, rash, sedation etc    Please note that this dictation was created using voice recognition software. I have made a reasonable attempt to correct obvious errors, but I expect that there are errors of grammar and possibly content that I did not discover before finalizing the note.    This note was electronically signed by Radha Sepulveda PA-C